# Patient Record
Sex: FEMALE | Race: WHITE | URBAN - METROPOLITAN AREA
[De-identification: names, ages, dates, MRNs, and addresses within clinical notes are randomized per-mention and may not be internally consistent; named-entity substitution may affect disease eponyms.]

---

## 2017-03-03 RX ORDER — ESZOPICLONE 1 MG/1
TABLET, FILM COATED ORAL
Qty: 30 TABLET | Refills: 2 | Status: SHIPPED | OUTPATIENT
Start: 2017-03-03 | End: 2017-05-16 | Stop reason: ALTCHOICE

## 2017-03-06 ENCOUNTER — OFFICE VISIT (OUTPATIENT)
Dept: INTERNAL MEDICINE CLINIC | Age: 47
End: 2017-03-06

## 2017-03-06 VITALS
DIASTOLIC BLOOD PRESSURE: 70 MMHG | SYSTOLIC BLOOD PRESSURE: 104 MMHG | WEIGHT: 153 LBS | OXYGEN SATURATION: 97 % | TEMPERATURE: 98 F | HEART RATE: 73 BPM | BODY MASS INDEX: 25.46 KG/M2

## 2017-03-06 DIAGNOSIS — J40 BRONCHITIS: ICD-10-CM

## 2017-03-06 DIAGNOSIS — J02.9 PHARYNGITIS, UNSPECIFIED ETIOLOGY: Primary | ICD-10-CM

## 2017-03-06 LAB — S PYO AG THROAT QL: NORMAL

## 2017-03-06 PROCEDURE — 99213 OFFICE O/P EST LOW 20 MIN: CPT | Performed by: NURSE PRACTITIONER

## 2017-03-06 PROCEDURE — 87880 STREP A ASSAY W/OPTIC: CPT | Performed by: NURSE PRACTITIONER

## 2017-03-06 RX ORDER — BENZONATATE 100 MG/1
200 CAPSULE ORAL 3 TIMES DAILY PRN
Qty: 30 CAPSULE | Refills: 0 | Status: SHIPPED | OUTPATIENT
Start: 2017-03-06 | End: 2017-03-13

## 2017-03-06 RX ORDER — BUDESONIDE AND FORMOTEROL FUMARATE DIHYDRATE 160; 4.5 UG/1; UG/1
2 AEROSOL RESPIRATORY (INHALATION) 2 TIMES DAILY
Qty: 1 INHALER | Refills: 0 | COMMUNITY
Start: 2017-03-06 | End: 2017-05-16 | Stop reason: ALTCHOICE

## 2017-03-07 ENCOUNTER — TELEPHONE (OUTPATIENT)
Dept: INTERNAL MEDICINE CLINIC | Age: 47
End: 2017-03-07

## 2017-03-23 ENCOUNTER — TELEPHONE (OUTPATIENT)
Dept: INTERNAL MEDICINE CLINIC | Age: 47
End: 2017-03-23

## 2017-03-23 RX ORDER — AMOXICILLIN AND CLAVULANATE POTASSIUM 875; 125 MG/1; MG/1
1 TABLET, FILM COATED ORAL 2 TIMES DAILY WITH MEALS
Qty: 20 TABLET | Refills: 0 | Status: SHIPPED | OUTPATIENT
Start: 2017-03-23 | End: 2017-03-23 | Stop reason: SDUPTHER

## 2017-03-23 RX ORDER — AMOXICILLIN AND CLAVULANATE POTASSIUM 875; 125 MG/1; MG/1
1 TABLET, FILM COATED ORAL 2 TIMES DAILY WITH MEALS
Qty: 20 TABLET | Refills: 0 | Status: SHIPPED | OUTPATIENT
Start: 2017-03-23 | End: 2017-04-02

## 2017-05-16 ENCOUNTER — TELEPHONE (OUTPATIENT)
Dept: INTERNAL MEDICINE CLINIC | Age: 47
End: 2017-05-16

## 2017-05-16 ENCOUNTER — OFFICE VISIT (OUTPATIENT)
Dept: INTERNAL MEDICINE CLINIC | Age: 47
End: 2017-05-16

## 2017-05-16 VITALS
OXYGEN SATURATION: 99 % | DIASTOLIC BLOOD PRESSURE: 78 MMHG | SYSTOLIC BLOOD PRESSURE: 107 MMHG | HEART RATE: 74 BPM | WEIGHT: 153 LBS | BODY MASS INDEX: 26.26 KG/M2

## 2017-05-16 DIAGNOSIS — Z01.818 PRE-OP EVALUATION: Primary | ICD-10-CM

## 2017-05-16 PROCEDURE — 93000 ELECTROCARDIOGRAM COMPLETE: CPT | Performed by: INTERNAL MEDICINE

## 2017-05-16 PROCEDURE — 99214 OFFICE O/P EST MOD 30 MIN: CPT | Performed by: INTERNAL MEDICINE

## 2017-05-18 PROBLEM — D27.0: Status: ACTIVE | Noted: 2017-05-18

## 2017-09-28 RX ORDER — ESZOPICLONE 1 MG/1
TABLET, FILM COATED ORAL
Qty: 30 TABLET | Refills: 0 | Status: SHIPPED | OUTPATIENT
Start: 2017-09-28 | End: 2017-11-22 | Stop reason: SDUPTHER

## 2017-09-29 ENCOUNTER — TELEPHONE (OUTPATIENT)
Dept: INTERNAL MEDICINE CLINIC | Age: 47
End: 2017-09-29

## 2017-10-04 ENCOUNTER — OFFICE VISIT (OUTPATIENT)
Dept: INTERNAL MEDICINE CLINIC | Age: 47
End: 2017-10-04

## 2017-10-04 VITALS
DIASTOLIC BLOOD PRESSURE: 77 MMHG | OXYGEN SATURATION: 97 % | BODY MASS INDEX: 26.8 KG/M2 | HEART RATE: 83 BPM | SYSTOLIC BLOOD PRESSURE: 109 MMHG | TEMPERATURE: 98.6 F | WEIGHT: 157 LBS

## 2017-10-04 DIAGNOSIS — M25.551 RIGHT HIP PAIN: Primary | ICD-10-CM

## 2017-10-04 DIAGNOSIS — R21 RASH: ICD-10-CM

## 2017-10-04 PROCEDURE — 99213 OFFICE O/P EST LOW 20 MIN: CPT | Performed by: INTERNAL MEDICINE

## 2017-10-04 NOTE — PROGRESS NOTES
[address] Internal Medicine  Patient Encounter   Bill Cazares MD    DOS: 10/4/2017    Shelby Kaufman  :1970       Assessment/Plan:    Right hip pain  Atypical ,mild. Not consistent with intra-articular issue but rather soft tissue. Reassured that she doesn't have blood clot. Will watch over next week and notify office if any worsening    Rash  Etiology unknown. Hearld patch ID? Early cellulitis? Early shingles- appearance not consistent but symptoms would be. To call if any blisters or progression of rash. Discussed medications with patient who voiced understanding of their use and indications. All questions answered. No Follow-up on file. Medical Assistant Triage:  Chief Complaint   Patient presents with    Hip Pain     right hip pain x 5 days. Slight pain, redness in area. HPI:   This is a 55 y.o. female. She is here today for right hip pain and rash. Worried about a blood clot. Noticed pain lateral right hip on Friday. Mild and not changed with movement. Has persisted and this morning noticed a rash on front of hip. Sensitive in the area of the rash. Feels like pins and needles at times. No itching. No injury or different exercise. Overall feels well except for allergies. No vaginal discharge. Review of Systems    As per HPI. Patient Active Problem List   Diagnosis    Insomnia    Right tubo-ovarian mass    IUD (intrauterine device) in place    Hydradenitis    Cystadenofibroma of right ovary     Prior to Visit Medications    Medication Sig Taking?  Authorizing Provider   eszopiclone (LUNESTA) 1 MG TABS TAKE 1 TABLET BY MOUTH NIGHTLY Yes Bill Cazares MD   Cetirizine HCl (ZYRTEC ALLERGY PO) Take by mouth daily Yes Historical Provider, MD   fluticasone (FLONASE) 50 MCG/ACT nasal spray 1 spray by Nasal route daily Yes Historical Provider, MD     Allergies   Allergen Reactions    No Known Allergies      BP Readings from Last 3 Encounters:   10/04/17 109/77   17 100/70   05/16/17 107/78      Wt Readings from Last 3 Encounters:   10/04/17 157 lb (71.2 kg)   06/06/17 154 lb (69.9 kg)   05/16/17 153 lb (69.4 kg)     OBJECTIVE:  Vitals:    10/04/17 1254   BP: 109/77   Pulse: 83   Temp: 98.6 °F (37 °C)   TempSrc: Oral   SpO2: 97%   Weight: 157 lb (71.2 kg)       Physical Exam   Cardiovascular: Normal rate and regular rhythm. Pulmonary/Chest: Effort normal and breath sounds normal.   Musculoskeletal:        Right hip: She exhibits tenderness. She exhibits normal range of motion, no swelling, no crepitus and no deformity. Decreased strength: slight tenderness over lateral aspect of right ilac crest.   Lymphadenopathy:        Right: No inguinal adenopathy present. Neurological: She has normal reflexes.

## 2017-10-04 NOTE — MR AVS SNAPSHOT
your BMI by decreasing your calorie intake and becoming more physically active. Learn more at: [email or web address]          Instructions    Gastroenterologist:  Thea Madison MD   [phone]              Medications and Orders      Your Current Medications Are              eszopiclone (LUNESTA) 1 MG TABS TAKE 1 TABLET BY MOUTH NIGHTLY    Cetirizine HCl (ZYRTEC ALLERGY PO) Take by mouth daily    fluticasone (FLONASE) 50 MCG/ACT nasal spray 1 spray by Nasal route daily      Allergies              No Known Allergies          Additional Information        Basic Information     Date Of Birth Sex Race Ethnicity Preferred Language    1970 Female White Non-/Non  English      Problem List as of 10/4/2017  Date Reviewed: 10/4/2017                Cystadenofibroma of right ovary    Right tubo-ovarian mass    IUD (intrauterine device) in place    Hydradenitis    Insomnia      Immunizations as of 10/4/2017     Name Date    Tdap (Boostrix, Adacel) 1/3/2012      Preventive Care        Date Due    Pap Smear 11/29/1991    Yearly Flu Vaccine (1) 9/1/2017    Cholesterol Screening 6/15/2021    Tetanus Combination Vaccine (2 - Td) 1/3/2022            [employer] Signup           Our records indicate that you have an active "[employer] account. You can view your After Visit Summary by going to [email or web address] and logging in with your "[employer] username and password. If you don't have a "[employer] username and password but a parent or guardian has access to your record, the parent or guardian should login with their own "[employer] username and password and access your record to view the After Visit Summary. Additional Information  If you have questions, please contact the physician practice where you receive care. Remember, "[employer] is NOT to be used for urgent needs. For medical emergencies, dial 911. For questions regarding your [employer] account call [phone]. If you have a clinical question, please call your doctor's office.

## 2017-11-08 ENCOUNTER — OFFICE VISIT (OUTPATIENT)
Dept: INTERNAL MEDICINE CLINIC | Age: 47
End: 2017-11-08

## 2017-11-08 VITALS
DIASTOLIC BLOOD PRESSURE: 71 MMHG | BODY MASS INDEX: 26.63 KG/M2 | HEART RATE: 64 BPM | OXYGEN SATURATION: 99 % | WEIGHT: 156 LBS | SYSTOLIC BLOOD PRESSURE: 99 MMHG

## 2017-11-08 DIAGNOSIS — G47.00 INSOMNIA, UNSPECIFIED TYPE: Primary | ICD-10-CM

## 2017-11-08 DIAGNOSIS — M25.551 RIGHT HIP PAIN: ICD-10-CM

## 2017-11-08 PROCEDURE — 99213 OFFICE O/P EST LOW 20 MIN: CPT | Performed by: INTERNAL MEDICINE

## 2017-11-08 PROCEDURE — 90471 IMMUNIZATION ADMIN: CPT | Performed by: INTERNAL MEDICINE

## 2017-11-08 PROCEDURE — 90688 IIV4 VACCINE SPLT 0.5 ML IM: CPT | Performed by: INTERNAL MEDICINE

## 2017-11-08 RX ORDER — TRAZODONE HYDROCHLORIDE 50 MG/1
50-100 TABLET ORAL NIGHTLY PRN
Qty: 60 TABLET | Refills: 2 | Status: SHIPPED | OUTPATIENT
Start: 2017-11-08 | End: 2018-06-04 | Stop reason: SDUPTHER

## 2017-11-08 ASSESSMENT — PATIENT HEALTH QUESTIONNAIRE - PHQ9
SUM OF ALL RESPONSES TO PHQ9 QUESTIONS 1 & 2: 0
1. LITTLE INTEREST OR PLEASURE IN DOING THINGS: 0
SUM OF ALL RESPONSES TO PHQ QUESTIONS 1-9: 0
2. FEELING DOWN, DEPRESSED OR HOPELESS: 0

## 2017-11-08 NOTE — PATIENT INSTRUCTIONS
breath. Do this over and over again, resisting the urge to  or be critical of yourself. Be content to start over as many times as you need to. Each time you catch the awareness drifting is an opportunity to strengthen the skill of mindful observation, a time to strengthen your mental discipline. It is not a bad thing, its just what the undisciplined mind does. Continue practicing in this manner until the end of the time you set aside for this period of mindfulness practice. Three Minute Breathing Space  From Ramiro Mendenhall and [employer] (2002). 1. Awareness  Bring yourself into the present moment by deliberately adopting an erect and dignified posture If possible, close your eyes. Then ask:  Shira Body is my experience right now in thoughts in feeling and in bodily sensations?   Acknowledge and register your experience, even if it is unwanted. 2. Gathering  Then, gently redirect full attention to breathing, to each in-breath and to each out-breath as they follow, one after the other. Your breath can function as an anchor to bring you into the present and help you tune into a state of awareness and stillness. 3. Expanding  Expand the field of your awareness around your breathing, so that it includes a sense of the body as a whole, your posture, and facial expression. Concentration Practice  (From Alisha and Alisha in: Mindfulness and Psychotherapy (2005))  1. Find a comfortable posture. Close your eyes. Allow your body to be held, supported by the chair. Notice directly the sensation of your body in contact with the chair. 2. Notice that your breath is already moving on its own. 3. Narrow your attention to the flow of the breath at the tip of your nose, as it contacts the nostrils. 4. Whenever your attention wanders, and you notice that it has wondered, return your attention to the flow of the breath at the tip of your nose.   5. Allow yourself a few more breaths before slowly opening your eyes.  Mindfulness Practice  (From Kimberley in: Mindfulness and Psychotherapy (2005))  1. Find a comfortable posture. Close your eyes. Allow your body to be held, supported by the chair. Notice directly the sensation of your body in contact with the chair. 2. Allow whatever arises in your field of experience - visual images, sounds, physical sensations, feelings, thought formations - to come and go, to move freely. 3. Next bring attention to whatever becomes predominant in the field of experience. Mentally notice and give a word label to the type of thoughts that may arise, such as analyzing, planning, remembering, hearing, and so on. 4. Take a few more breaths before slowly opening your eyes.

## 2017-11-08 NOTE — PROGRESS NOTES
[address] [facility] Primary Care  Office Visit   Luiza Etienne MD    2017    Eva Watters  :1970       Assessment/Plan:  Insomnia, unspecified type  Would like to get off lunesta. Trying to titrate completely    Right hip pain  Posterior iliac crest.  Refer ot PT    Discussed medications with patient who voiced understanding of their use and indications. All questions answered. No Follow-up on file. Chief Complaint   Patient presents with    Check-Up     patient wants to discuss changing sleep meds. HPI:   This 55 y.o. female here for follow-up of insomia and to discuss hip discomfort. Still with pain on lateral right hip. 3/10 now, had been worse. In bed and when stands. Not with walking. Lately knees achey as well. No warmth or swelling. Rash resolved on its own. Insomnia: Has stopped before surgery and then after couldn't sleep again. Now taking 1/2 tablet 2-3 x/night, but has gone 2 nights without sleeping at all. ROS  As per HPI. Patient Active Problem List   Diagnosis    Insomnia    Right tubo-ovarian mass    IUD (intrauterine device) in place    Hydradenitis    Cystadenofibroma of right ovary     Prior to Visit Medications    Medication Sig Taking?  Authorizing Provider   traZODone (DESYREL) 50 MG tablet Take 1-2 tablets by mouth nightly as needed for Sleep Yes Luiza Etienne MD   eszopiclone (LUNESTA) 1 MG TABS TAKE 1 TABLET BY MOUTH NIGHTLY Yes Luiza Etienne MD   Cetirizine HCl (ZYRTEC ALLERGY PO) Take by mouth daily Yes Historical Provider, MD   fluticasone (FLONASE) 50 MCG/ACT nasal spray 1 spray by Nasal route daily Yes Historical Provider, MD       OBJECTIVE:  BP Readings from Last 3 Encounters:   17 99/71   10/04/17 109/77   17 100/70      Wt Readings from Last 3 Encounters:   17 156 lb (70.8 kg)   10/04/17 157 lb (71.2 kg)   17 154 lb (69.9 kg)     Vitals:    17 1056   BP: 99/71   Pulse: 64   SpO2: 99%   Weight: 156 lb (70.8 kg)     Body mass index is 26.63 kg/m². Physical Exam   Musculoskeletal:        Right hip: Normal. She exhibits normal range of motion and no tenderness.    Posterior aspect of right iliac crest tender

## 2017-11-08 NOTE — PROGRESS NOTES
Vaccine Information Sheet, \"Influenza - Inactivated\"  given to Christin Green, or parent/legal guardian of  Christin Green and verbalized understanding. Patient responses:    Have you ever had a reaction to a flu vaccine? Yes and No  Are you able to eat eggs without adverse effects? Yes  Do you have any current illness? No  Have you ever had Guillian Gurley Syndrome? No    Flu vaccine given per order. Please see immunization tab.

## 2017-11-22 RX ORDER — ESZOPICLONE 1 MG/1
TABLET, FILM COATED ORAL
Qty: 30 TABLET | Refills: 0 | Status: SHIPPED | OUTPATIENT
Start: 2017-11-22 | End: 2018-01-11 | Stop reason: SDUPTHER

## 2018-01-12 RX ORDER — ESZOPICLONE 1 MG/1
1 TABLET, FILM COATED ORAL NIGHTLY
Qty: 30 TABLET | Refills: 0 | Status: SHIPPED | OUTPATIENT
Start: 2018-01-12 | End: 2018-03-12 | Stop reason: SDUPTHER

## 2018-03-12 RX ORDER — ESZOPICLONE 1 MG/1
TABLET, FILM COATED ORAL
Qty: 30 TABLET | Refills: 0 | Status: SHIPPED | OUTPATIENT
Start: 2018-03-12 | End: 2018-04-11

## 2018-06-04 ENCOUNTER — PATIENT MESSAGE (OUTPATIENT)
Dept: INTERNAL MEDICINE CLINIC | Age: 48
End: 2018-06-04

## 2018-06-04 RX ORDER — TRAZODONE HYDROCHLORIDE 50 MG/1
50-100 TABLET ORAL NIGHTLY PRN
Qty: 60 TABLET | Refills: 2 | Status: SHIPPED | OUTPATIENT
Start: 2018-06-04 | End: 2018-10-16 | Stop reason: SDUPTHER

## 2018-10-16 ENCOUNTER — OFFICE VISIT (OUTPATIENT)
Dept: INTERNAL MEDICINE CLINIC | Age: 48
End: 2018-10-16
Payer: COMMERCIAL

## 2018-10-16 VITALS
HEIGHT: 65 IN | SYSTOLIC BLOOD PRESSURE: 112 MMHG | OXYGEN SATURATION: 98 % | HEART RATE: 64 BPM | BODY MASS INDEX: 25.83 KG/M2 | WEIGHT: 155 LBS | DIASTOLIC BLOOD PRESSURE: 62 MMHG

## 2018-10-16 DIAGNOSIS — Z13.1 SCREENING FOR DIABETES MELLITUS: ICD-10-CM

## 2018-10-16 DIAGNOSIS — G47.00 INSOMNIA, UNSPECIFIED TYPE: Primary | ICD-10-CM

## 2018-10-16 LAB
ANION GAP SERPL CALCULATED.3IONS-SCNC: 12 MMOL/L (ref 3–16)
BUN BLDV-MCNC: 11 MG/DL (ref 7–20)
CALCIUM SERPL-MCNC: 9.1 MG/DL (ref 8.3–10.6)
CHLORIDE BLD-SCNC: 101 MMOL/L (ref 99–110)
CO2: 27 MMOL/L (ref 21–32)
CREAT SERPL-MCNC: 0.9 MG/DL (ref 0.6–1.1)
GFR AFRICAN AMERICAN: >60
GFR NON-AFRICAN AMERICAN: >60
GLUCOSE FASTING: 85 MG/DL (ref 70–99)
POTASSIUM SERPL-SCNC: 4.4 MMOL/L (ref 3.5–5.1)
SODIUM BLD-SCNC: 140 MMOL/L (ref 136–145)

## 2018-10-16 PROCEDURE — 99213 OFFICE O/P EST LOW 20 MIN: CPT | Performed by: INTERNAL MEDICINE

## 2018-10-16 RX ORDER — TRAZODONE HYDROCHLORIDE 50 MG/1
75 TABLET ORAL NIGHTLY PRN
Qty: 135 TABLET | Refills: 3 | Status: SHIPPED | OUTPATIENT
Start: 2018-10-16 | End: 2019-11-26 | Stop reason: SDUPTHER

## 2018-10-16 ASSESSMENT — PATIENT HEALTH QUESTIONNAIRE - PHQ9
SUM OF ALL RESPONSES TO PHQ QUESTIONS 1-9: 0
SUM OF ALL RESPONSES TO PHQ9 QUESTIONS 1 & 2: 0
SUM OF ALL RESPONSES TO PHQ QUESTIONS 1-9: 0
1. LITTLE INTEREST OR PLEASURE IN DOING THINGS: 0
2. FEELING DOWN, DEPRESSED OR HOPELESS: 0

## 2019-11-30 RX ORDER — TRAZODONE HYDROCHLORIDE 50 MG/1
TABLET ORAL
Qty: 135 TABLET | Refills: 0 | Status: SHIPPED | OUTPATIENT
Start: 2019-11-30 | End: 2020-04-02

## 2020-01-08 ENCOUNTER — OFFICE VISIT (OUTPATIENT)
Dept: INTERNAL MEDICINE CLINIC | Age: 50
End: 2020-01-08
Payer: COMMERCIAL

## 2020-01-08 VITALS
OXYGEN SATURATION: 98 % | SYSTOLIC BLOOD PRESSURE: 96 MMHG | BODY MASS INDEX: 24.49 KG/M2 | HEIGHT: 65 IN | WEIGHT: 147 LBS | HEART RATE: 72 BPM | DIASTOLIC BLOOD PRESSURE: 60 MMHG

## 2020-01-08 PROCEDURE — 99396 PREV VISIT EST AGE 40-64: CPT | Performed by: INTERNAL MEDICINE

## 2020-01-08 SDOH — HEALTH STABILITY: MENTAL HEALTH: HOW MANY STANDARD DRINKS CONTAINING ALCOHOL DO YOU HAVE ON A TYPICAL DAY?: 1 OR 2

## 2020-01-08 SDOH — HEALTH STABILITY: MENTAL HEALTH: HOW OFTEN DO YOU HAVE A DRINK CONTAINING ALCOHOL?: 2-3 TIMES A WEEK

## 2020-01-08 ASSESSMENT — PATIENT HEALTH QUESTIONNAIRE - PHQ9
1. LITTLE INTEREST OR PLEASURE IN DOING THINGS: 0
SUM OF ALL RESPONSES TO PHQ QUESTIONS 1-9: 0
SUM OF ALL RESPONSES TO PHQ9 QUESTIONS 1 & 2: 0
2. FEELING DOWN, DEPRESSED OR HOPELESS: 0
SUM OF ALL RESPONSES TO PHQ QUESTIONS 1-9: 0

## 2020-01-08 NOTE — PROGRESS NOTES
appearance. She is well-developed. HENT:      Head: Normocephalic and atraumatic. Right Ear: Tympanic membrane, ear canal and external ear normal.      Left Ear: Tympanic membrane, ear canal and external ear normal.      Nose: Nose normal.      Mouth/Throat:      Mouth: Mucous membranes are moist.      Pharynx: Oropharynx is clear. Eyes:      Conjunctiva/sclera: Conjunctivae normal.      Pupils: Pupils are equal, round, and reactive to light. Neck:      Musculoskeletal: Normal range of motion and neck supple. Thyroid: No thyromegaly. Vascular: No carotid bruit. Cardiovascular:      Rate and Rhythm: Normal rate and regular rhythm. Pulses: Normal pulses. Heart sounds: Normal heart sounds. No murmur. Comments: Prominent vein posterior right calf  Pulmonary:      Effort: Pulmonary effort is normal.      Breath sounds: Normal breath sounds. Abdominal:      General: Bowel sounds are normal.      Palpations: Abdomen is soft. There is no mass. Tenderness: There is no tenderness. Genitourinary:     Exam position: Supine. Musculoskeletal:      Right lower leg: No edema. Left lower leg: No edema. Lymphadenopathy:      Cervical: No cervical adenopathy. Skin:     General: Skin is warm and dry. Coloration: Skin is not pale. Findings: No rash. Comments: No suspicious skin lesions   Neurological:      General: No focal deficit present. Mental Status: She is alert. Psychiatric:         Mood and Affect: Mood normal.       Glucose:  Glucose (mg/dL)   Date Value   05/15/2017 83     Lipids  Lab Results   Component Value Date    CHOL 160 06/15/2016    TRIG 125 06/15/2016    HDL 52 06/15/2016    LDLCALC 83 06/15/2016       ASSESSMENT/PLAN:  Annual PE/Wellness exam  Discussed age appropriate preventive care including healthy diet, daily exercise, immunizations and age & gender guided screening tests.   Screening labs  Patient chriss schedule with

## 2020-01-10 ENCOUNTER — PATIENT MESSAGE (OUTPATIENT)
Dept: INTERNAL MEDICINE CLINIC | Age: 50
End: 2020-01-10

## 2020-04-02 RX ORDER — TRAZODONE HYDROCHLORIDE 50 MG/1
TABLET ORAL
Qty: 135 TABLET | Refills: 0 | Status: SHIPPED | OUTPATIENT
Start: 2020-04-02 | End: 2020-06-29

## 2020-06-29 RX ORDER — TRAZODONE HYDROCHLORIDE 50 MG/1
TABLET ORAL
Qty: 135 TABLET | Refills: 0 | Status: SHIPPED | OUTPATIENT
Start: 2020-06-29 | End: 2020-08-19

## 2020-08-19 RX ORDER — TRAZODONE HYDROCHLORIDE 50 MG/1
TABLET ORAL
Qty: 135 TABLET | Refills: 0 | Status: SHIPPED | OUTPATIENT
Start: 2020-08-19 | End: 2020-11-16 | Stop reason: SDUPTHER

## 2020-08-19 NOTE — TELEPHONE ENCOUNTER
Last appointment: 1/8/2020  Next appointment: Visit date not found  Last refill: 6/29/20      Disposition states to return around 1/8/2021.

## 2020-11-16 ENCOUNTER — TELEPHONE (OUTPATIENT)
Dept: INTERNAL MEDICINE CLINIC | Age: 50
End: 2020-11-16

## 2020-11-16 RX ORDER — TRAZODONE HYDROCHLORIDE 50 MG/1
TABLET ORAL
Qty: 135 TABLET | Refills: 0 | Status: SHIPPED | OUTPATIENT
Start: 2020-11-16 | End: 2021-02-16 | Stop reason: SDUPTHER

## 2020-11-16 NOTE — TELEPHONE ENCOUNTER
Patient is requesting a re-fill on Trazadone be sent to her pharmacy. Please call patient with any questions/concerns. Thanks.

## 2020-11-16 NOTE — TELEPHONE ENCOUNTER
Last appointment: 1/8/2020  Next appointment: Visit date not found  Last refill: 08/19/2020 # 135 with no refills

## 2020-11-20 ENCOUNTER — TELEPHONE (OUTPATIENT)
Dept: INTERNAL MEDICINE CLINIC | Age: 50
End: 2020-11-20

## 2020-11-20 NOTE — TELEPHONE ENCOUNTER
Spoke with patient, son was exposed last Friday to a + Covid, he started having sx Saturday was tested yesterday for COVID and was +. Patient started with nasal congestion/head cold Wednesday. She would like to be tested today. She is taking daughter to be tested [facility] and would like an order sent there for her self.  Please advise  Thank you

## 2020-11-21 LAB — SARS-COV-2: DETECTED

## 2020-11-24 ENCOUNTER — VIRTUAL VISIT (OUTPATIENT)
Dept: INTERNAL MEDICINE CLINIC | Age: 50
End: 2020-11-24
Payer: COMMERCIAL

## 2020-11-24 PROCEDURE — 99213 OFFICE O/P EST LOW 20 MIN: CPT | Performed by: INTERNAL MEDICINE

## 2020-11-24 NOTE — PROGRESS NOTES
2020    TELEHEALTH EVALUATION -- Audio/Visual (During [id number] public health emergency)    HPI:  Jossie Munoz (:  1970) has requested an audio/video evaluation for the following concern(s):   Follow-up (COVID f/u)     Covid test done last Friday after she found out that son had tested positive following exposure to a hockey game. Son just had mild cold symptoms patient states she has the same  Feels just like cold and also headache that started 2020. Extra tired and achy. No chest tightness, shortness of breath or coughing. She denies nausea vomiting diarrhea, loss of taste or smell. Review of Systems    Prior to Visit Medications    Medication Sig Taking? Authorizing Provider   traZODone (DESYREL) 50 MG tablet TAKE 1 AND 1/2 TABLETS BY MOUTH EVERY NIGHT AS NEEDED FOR SLEEP Yes Daniella Nicholson MD   Cetirizine HCl (ZYRTEC ALLERGY PO) Take by mouth daily Yes Historical Provider, MD       Social History     Tobacco Use    Smoking status: Never Smoker    Smokeless tobacco: Never Used   Substance Use Topics    Alcohol use: Yes     Alcohol/week: 0.0 standard drinks     Frequency: 2-3 times a week     Drinks per session: 1 or 2     Comment: 2 beer/wine a week    Drug use: No            BP Readings from Last 3 Encounters:   20 96/60   10/16/18 112/62   17 99/71     Wt Readings from Last 3 Encounters:   20 147 lb (66.7 kg)   10/16/18 155 lb (70.3 kg)   17 156 lb (70.8 kg)       PHYSICAL EXAMINATION:  Patient-Reported Vitals 2020   Patient-Reported Weight 145 lb   Patient-Reported Pulse 59   Patient-Reported Temperature 97.2      Physical Exam  Constitutional:       General: She is not in acute distress. Appearance: Normal appearance. She is not ill-appearing. Pulmonary:      Effort: Pulmonary effort is normal. No respiratory distress. Skin:     Coloration: Skin is not pale. Neurological:      General: No focal deficit present.       Mental Status: She is alert. Psychiatric:         Mood and Affect: Mood normal.         Due to this being a TeleHealth encounter, evaluation of the following organ systems is limited: Vitals/Constitutional/EENT/Resp/CV/GI//MS/Neuro/Skin/Heme-Lymph-Imm    ASSESSMENT/PLAN:  COVID-19 virus infection  Mild symptoms, with no risk factors for severe disease. Discussed self management and self monitoring. Patient has pulse ox  Reviewed guidelines for isolation, and advised patient that in the event that she is better, with no fever, her isolation will and November 30. Patient has been instructed to contact the office with worsening of symptoms    An  electronic signature was used to authenticate this note. --Celestino Jonas MD on 11/24/2020 at 12:28 PM    [id number]    Pursuant to the emergency declaration under the [facility] waiver authority and the [employer] Act, this Virtual  Visit was conducted, with patient's consent, to reduce the patient's risk of exposure to COVID-19 and provide continuity of care for an established patient. Services were provided through a video synchronous discussion virtually to substitute for in-person clinic visit.

## 2021-01-14 ENCOUNTER — E-VISIT (OUTPATIENT)
Dept: INTERNAL MEDICINE CLINIC | Age: 51
End: 2021-01-14
Payer: COMMERCIAL

## 2021-01-14 DIAGNOSIS — B37.31 VAGINAL CANDIDIASIS: Primary | ICD-10-CM

## 2021-01-14 PROCEDURE — 99421 OL DIG E/M SVC 5-10 MIN: CPT | Performed by: INTERNAL MEDICINE

## 2021-01-14 RX ORDER — FLUCONAZOLE 150 MG/1
150 TABLET ORAL ONCE
Qty: 1 TABLET | Refills: 0 | Status: SHIPPED | OUTPATIENT
Start: 2021-01-14 | End: 2021-01-14

## 2021-01-14 NOTE — PROGRESS NOTES
HPI: as per patient provided history  Exam: N/A (electronic visit)  ASSESSMENT/PLAN:  Diagnoses and all orders for this visit:    Vaginal candidiasis    Other orders  -     fluconazole (DIFLUCAN) 150 MG tablet; Take 1 tablet by mouth once for 1 dose        Patient instructed to call the office if worsens, or fails to improve as anticipated. 5-10 minutes were spent on the digital evaluation and management of this patient.

## 2021-02-16 RX ORDER — TRAZODONE HYDROCHLORIDE 50 MG/1
TABLET ORAL
Qty: 135 TABLET | Refills: 0 | Status: SHIPPED | OUTPATIENT
Start: 2021-02-16 | End: 2021-08-22

## 2021-02-16 NOTE — TELEPHONE ENCOUNTER
Received refill request from pharmacy for Trazodone.   LOV 1/8/2020  No upcoming appts  Last filled 11/16/2020  Medication pended

## 2021-02-23 ENCOUNTER — TELEPHONE (OUTPATIENT)
Dept: INTERNAL MEDICINE CLINIC | Age: 51
End: 2021-02-23

## 2021-02-23 NOTE — TELEPHONE ENCOUNTER
It could be due to the difference in med, especially if it correlates with starting the new medication.   Since she has the prescription, she could try 1 1/2-2 tabs  and see if works any better at a little higher dose

## 2021-02-23 NOTE — TELEPHONE ENCOUNTER
Patient recently got a RX for Trazadone but looks different and pharmacy told her different . Since she has been taking she is having trouble sleeping again. Could this be attributed to the new . Please call to discuss her concerns.

## 2021-08-22 RX ORDER — TRAZODONE HYDROCHLORIDE 50 MG/1
TABLET ORAL
Qty: 60 TABLET | Refills: 0 | Status: SHIPPED | OUTPATIENT
Start: 2021-08-22 | End: 2021-09-30

## 2021-08-22 NOTE — TELEPHONE ENCOUNTER
Refill has been approved. Not seen in office since 1/2020. Have have schedule physical (or just med of/u if she prefers).

## 2021-09-30 RX ORDER — TRAZODONE HYDROCHLORIDE 50 MG/1
TABLET ORAL
Qty: 60 TABLET | Refills: 0 | Status: SHIPPED | OUTPATIENT
Start: 2021-09-30 | End: 2021-11-10

## 2021-09-30 NOTE — TELEPHONE ENCOUNTER
Last appointment: 1/14/2021  Next appointment: Visit date not found  Last refill: 8/22/2021  Sent wufoo message to schedule due/overdue appointment.

## 2021-11-10 RX ORDER — TRAZODONE HYDROCHLORIDE 50 MG/1
TABLET ORAL
Qty: 60 TABLET | Refills: 0 | Status: SHIPPED | OUTPATIENT
Start: 2021-11-10 | End: 2021-12-18

## 2021-11-10 NOTE — TELEPHONE ENCOUNTER
Last appointment: 1/14/2021  Next appointment: Visit date not found  Last refill: 9/30/21  Sent [employer] message to schedule due/overdue appointment.

## 2021-11-12 ENCOUNTER — TELEPHONE (OUTPATIENT)
Dept: INTERNAL MEDICINE CLINIC | Age: 51
End: 2021-11-12

## 2021-11-12 DIAGNOSIS — Z13.1 SCREENING FOR DIABETES MELLITUS: Primary | ICD-10-CM

## 2021-11-12 DIAGNOSIS — Z13.220 LIPID SCREENING: ICD-10-CM

## 2021-11-12 DIAGNOSIS — Z11.59 ENCOUNTER FOR HEPATITIS C SCREENING TEST FOR LOW RISK PATIENT: ICD-10-CM

## 2021-11-12 NOTE — TELEPHONE ENCOUNTER
----- Message from Herve Pemberton sent at 11/12/2021  9:31 AM EST -----  Subject: Message to Provider    QUESTIONS  Information for Provider? PT scheduled her physical and wanted to check to   see if she needed to do bloodwork prior to her appt or if that would be   discussed at the appt.   ---------------------------------------------------------------------------  --------------  CALL BACK INFO  What is the best way for the office to contact you? OK to leave message on   voicemail  Preferred Call Back Phone Number? [phone]  ---------------------------------------------------------------------------  --------------  SCRIPT ANSWERS  Relationship to Patient?  Self

## 2021-12-18 RX ORDER — TRAZODONE HYDROCHLORIDE 50 MG/1
TABLET ORAL
Qty: 60 TABLET | Refills: 0 | Status: SHIPPED | OUTPATIENT
Start: 2021-12-18 | End: 2021-12-21

## 2021-12-20 PROBLEM — D27.0: Status: RESOLVED | Noted: 2017-05-18 | Resolved: 2021-12-20

## 2021-12-21 ENCOUNTER — OFFICE VISIT (OUTPATIENT)
Dept: INTERNAL MEDICINE CLINIC | Age: 51
End: 2021-12-21
Payer: COMMERCIAL

## 2021-12-21 VITALS
RESPIRATION RATE: 14 BRPM | BODY MASS INDEX: 25.19 KG/M2 | OXYGEN SATURATION: 98 % | HEART RATE: 64 BPM | DIASTOLIC BLOOD PRESSURE: 80 MMHG | SYSTOLIC BLOOD PRESSURE: 112 MMHG | HEIGHT: 65 IN | WEIGHT: 151.2 LBS

## 2021-12-21 DIAGNOSIS — G47.00 INSOMNIA, UNSPECIFIED TYPE: ICD-10-CM

## 2021-12-21 DIAGNOSIS — Z86.39 H/O VITAMIN D DEFICIENCY: ICD-10-CM

## 2021-12-21 DIAGNOSIS — Z13.220 LIPID SCREENING: ICD-10-CM

## 2021-12-21 DIAGNOSIS — Z00.00 ANNUAL PHYSICAL EXAM: Primary | ICD-10-CM

## 2021-12-21 DIAGNOSIS — Z13.1 SCREENING FOR DIABETES MELLITUS: ICD-10-CM

## 2021-12-21 PROCEDURE — 90471 IMMUNIZATION ADMIN: CPT | Performed by: INTERNAL MEDICINE

## 2021-12-21 PROCEDURE — 99396 PREV VISIT EST AGE 40-64: CPT | Performed by: INTERNAL MEDICINE

## 2021-12-21 PROCEDURE — 90674 CCIIV4 VAC NO PRSV 0.5 ML IM: CPT | Performed by: INTERNAL MEDICINE

## 2021-12-21 RX ORDER — M-VIT,TX,IRON,MINS/CALC/FOLIC 27MG-0.4MG
1 TABLET ORAL DAILY
COMMUNITY
End: 2022-08-23

## 2021-12-21 RX ORDER — TRAZODONE HYDROCHLORIDE 50 MG/1
75 TABLET ORAL NIGHTLY
Qty: 135 TABLET | Refills: 3 | Status: SHIPPED | OUTPATIENT
Start: 2021-12-21

## 2021-12-21 SDOH — ECONOMIC STABILITY: FOOD INSECURITY: WITHIN THE PAST 12 MONTHS, THE FOOD YOU BOUGHT JUST DIDN'T LAST AND YOU DIDN'T HAVE MONEY TO GET MORE.: NEVER TRUE

## 2021-12-21 SDOH — ECONOMIC STABILITY: FOOD INSECURITY: WITHIN THE PAST 12 MONTHS, YOU WORRIED THAT YOUR FOOD WOULD RUN OUT BEFORE YOU GOT MONEY TO BUY MORE.: NEVER TRUE

## 2021-12-21 ASSESSMENT — PATIENT HEALTH QUESTIONNAIRE - PHQ9
2. FEELING DOWN, DEPRESSED OR HOPELESS: 0
SUM OF ALL RESPONSES TO PHQ9 QUESTIONS 1 & 2: 0
SUM OF ALL RESPONSES TO PHQ QUESTIONS 1-9: 0
1. LITTLE INTEREST OR PLEASURE IN DOING THINGS: 0

## 2021-12-21 ASSESSMENT — SOCIAL DETERMINANTS OF HEALTH (SDOH): HOW HARD IS IT FOR YOU TO PAY FOR THE VERY BASICS LIKE FOOD, HOUSING, MEDICAL CARE, AND HEATING?: NOT HARD AT ALL

## 2021-12-21 NOTE — PROGRESS NOTES
ASSESSMENT/PLAN:   Annual physical/preventive evaluation  Discussed age appropriate preventive care including healthy diet, daily exercise, immunizations and age & gender guided screening tests. Flu shot today. Patient will get covid booster today as well through [facility]  Patient aware she is due for Shingrix vaccine, to get in the new year. Pap smear requested from Dr. Mervat Charles. Mammogram requested from [facility] Screening for diabetes mellitus  -     Basic Metabolic Panel; Future  -     Hemoglobin A1C; Future  Lipid screening  -     Lipid Panel; Future  H/O vitamin D deficiency  -     Vitamin D 25 Hydroxy; Future  Insomnia, unspecified type  Assessment & Plan:  Well managed with trazodone 75 mg nightly. Continue same. Return in about 1 year (around 2022) for CPE. Meaghan Enrique (:  1970) is a 46 y.o. female, here for annual preventive visit. Exercise: 3-4x/week Pilates, body pump and spin  Diet: Regular    Taking multivitamin. Says d was low in past. Dr. Garcia Dries? Check mole on neck. Achier in general but thinks just age. Last period was in the summer, really heavy. Not having hot flashes. IUD removed earlier this  Year. No LMP recorded (lmp unknown). (Menstrual status: Irregular periods).      Patient Care Team:  Sinan Gtz MD as PCP - General (Internal Medicine)  Sinan Gtz MD as PCP - [address] [place] [address] [facility]  Abdirahman Sneed MD (Obstetrics & Gynecology)    Health Maintenance   Topic Date Due    Hepatitis C screen  Never done    Cervical cancer screen  Never done    Breast cancer screen  Never done    Shingles Vaccine (1 of 2) Never done    COVID-19 Vaccine (3 - Booster for [employer] series) 10/09/2021    DTaP/Tdap/Td vaccine (2 - Td or Tdap) 2022    Lipid screen  01/15/2025    Colon cancer screen colonoscopy  10/02/2028    Flu vaccine  Completed    HIV screen  Completed    Hepatitis A vaccine  Aged Out    Hepatitis B vaccine  Aged Out  Hib vaccine  Aged Out    Meningococcal (ACWY) vaccine  Aged Out    Pneumococcal 0-64 years Vaccine  Aged [employer] History   Administered Date(s) Administered    COVID-19, Pfizer, PF, 30mcg/0.3mL 03/20/2021, 04/09/2021    Influenza Vaccine, unspecified formulation 09/10/2018    Influenza Virus Vaccine 09/21/2020    Influenza, MDCK Quadv, IM, PF (Flucelvax 2 yrs and older) 12/21/2021    Influenza, Alexx Sinhala, 6 mo and older, IM (Fluzone, Flulaval) 11/08/2017    Influenza, Triv, 3 Years and older, IM (Afluria (5 yrs and older) 10/01/2019    Tdap (Boostrix, Adacel) 01/03/2012       Past Medical History:   Diagnosis Date    Anxiety 01/29/2015    situational; Didn't tolerate lexapro or effexor     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 Gerard Orta, every 5 years due to family history    DILATION AND CURETTAGE OF UTERUS  05/2006    DILATION AND CURETTAGE OF UTERUS  08/2008    DILATION AND CURETTAGE OF UTERUS  02/2009    SALPINGECTOMY Left 05/2017    Dr. Kiara Freire SALPINGO-OOPHORECTOMY Right 05/2017    Cystadenofibroma    UMBILICAL HERNIA REPAIR  2010    with mesh       Social History     Tobacco Use    Smoking status: Never Smoker    Smokeless tobacco: Never Used   Vaping Use    Vaping Use: Never used   Substance Use Topics    Alcohol use:  Yes     Alcohol/week: 0.0 standard drinks     Comment: 2 beer/wine a week    Drug use: No        Family History   Problem Relation Age of Onset    Colon Cancer Paternal Grandfather     Prostate Cancer Paternal Grandfather     Diabetes Paternal Grandfather     Cancer Paternal Grandfather     Colon Cancer Paternal Uncle     Prostate Cancer Other     Cancer Other     Asthma Mother     Depression Mother     High Cholesterol Mother     Hypertension Father     High Cholesterol Father     Coronary Art Dis Maternal Aunt     Coronary Art Dis Maternal Uncle     Lung Cancer Maternal Grandmother         smoker    Cancer Maternal Grandfather     Cancer Paternal Grandmother         non-hodgkins lymphoma     Review of Systems    Wt Readings from Last 5 Encounters:   12/21/21 151 lb 3.2 oz (68.6 kg)   01/08/20 147 lb (66.7 kg)   10/16/18 155 lb (70.3 kg)   11/08/17 156 lb (70.8 kg)   10/04/17 157 lb (71.2 kg)     Vitals:    12/21/21 1439   BP: 112/80   Pulse: 64   Resp: 14   SpO2: 98%   Weight: 151 lb 3.2 oz (68.6 kg)   Height: 5' 5\" (1.651 m)     Estimated body mass index is 25.16 kg/m² as calculated from the following:    Height as of this encounter: 5' 5\" (1.651 m). Weight as of this encounter: 151 lb 3.2 oz (68.6 kg). Physical Exam  Constitutional:       Appearance: Normal appearance. She is well-developed. HENT:      Right Ear: Tympanic membrane, ear canal and external ear normal.      Left Ear: Tympanic membrane, ear canal and external ear normal.      Nose: Nose normal.   Eyes:      Conjunctiva/sclera: Conjunctivae normal.      Pupils: Pupils are equal, round, and reactive to light. Neck:      Thyroid: No thyromegaly. Vascular: No carotid bruit. Cardiovascular:      Rate and Rhythm: Normal rate and regular rhythm. Pulses: Normal pulses. Heart sounds: Normal heart sounds. No murmur heard. Comments: Prominent vein posterior right calf  Pulmonary:      Effort: Pulmonary effort is normal.      Breath sounds: Normal breath sounds.    Abdominal: General: Bowel sounds are normal.      Palpations: Abdomen is soft. There is no mass. Tenderness: There is no abdominal tenderness. Genitourinary:     Exam position: Supine. Musculoskeletal:      Right lower leg: No edema. Left lower leg: No edema. Lymphadenopathy:      Cervical: No cervical adenopathy. Skin:     General: Skin is warm and dry. Coloration: Skin is not pale. Findings: No rash. Comments: No suspicious skin lesions. Wide based skin tag back of neck toward right. Neurological:      General: No focal deficit present. Mental Status: She is alert. Psychiatric:         Mood and Affect: Mood normal.         No flowsheet data found. Lab Results   Component Value Date    CHOL 201 01/08/2020    CHOLFAST 163 01/15/2020    TRIG 80 01/08/2020    TRIGLYCFAST 63 01/15/2020    HDL 54 01/15/2020    LDLCALC 96 01/15/2020    GLUF 85 10/16/2018    GLUCOSE 81 01/08/2020    LABA1C 5.2 01/08/2020       The 10-year ASCVD risk score (Braydon Lee et al., 2013) is: 0.8%    Values used to calculate the score:      Age: 46 years      Sex: Female      Is Non- : No      Diabetic: No      Tobacco smoker: No      Systolic Blood Pressure: 327 mmHg      Is BP treated: No      HDL Cholesterol: 54 mg/dL      Total Cholesterol: 163 mg/dL      An electronic signature was used to authenticate this note.     --Faiza Guerrero MD

## 2022-08-10 ENCOUNTER — TELEPHONE (OUTPATIENT)
Dept: INTERNAL MEDICINE CLINIC | Age: 52
End: 2022-08-10

## 2022-08-10 DIAGNOSIS — N64.4 BREAST PAIN, RIGHT: Primary | ICD-10-CM

## 2022-08-10 NOTE — TELEPHONE ENCOUNTER
----- Message from Juan Kelly sent at 8/10/2022  2:09 PM EDT -----  Subject: Appointment Request    Reason for Call: Established Patient Appointment needed: Urgent (Patient   Request) No Script    QUESTIONS    Reason for appointment request? Available appointments did not meet   patient need     Additional Information for Provider? Pt need a new order for mammo gram   for right breasts that having some soreness for last 10 days.  Need a call   back for apt to get order for mammo gram.   ---------------------------------------------------------------------------  --------------  Johanny DALTON  [phone]; OK to leave message on voicemail  ---------------------------------------------------------------------------  --------------  SCRIPT ANSWERS  COVID Screen: Shawna Hamm

## 2022-08-11 ENCOUNTER — TELEPHONE (OUTPATIENT)
Dept: INTERNAL MEDICINE CLINIC | Age: 52
End: 2022-08-11

## 2022-08-11 NOTE — TELEPHONE ENCOUNTER
Patient states she \"doesn't go to a [facility] for her DX Mammogram.\"  She is requesting a New order please be generated so she can have this done [facility] on [address]

## 2022-08-23 ENCOUNTER — OFFICE VISIT (OUTPATIENT)
Dept: INTERNAL MEDICINE CLINIC | Age: 52
End: 2022-08-23
Payer: COMMERCIAL

## 2022-08-23 VITALS
BODY MASS INDEX: 25.96 KG/M2 | OXYGEN SATURATION: 98 % | HEIGHT: 65 IN | WEIGHT: 155.8 LBS | HEART RATE: 67 BPM | TEMPERATURE: 97.8 F | SYSTOLIC BLOOD PRESSURE: 110 MMHG | DIASTOLIC BLOOD PRESSURE: 66 MMHG

## 2022-08-23 DIAGNOSIS — G47.00 INSOMNIA, UNSPECIFIED TYPE: Primary | ICD-10-CM

## 2022-08-23 DIAGNOSIS — M79.605 LEFT LEG PAIN: ICD-10-CM

## 2022-08-23 DIAGNOSIS — M25.561 ARTHRALGIA OF BOTH KNEES: ICD-10-CM

## 2022-08-23 DIAGNOSIS — M94.0 ACUTE COSTOCHONDRITIS: ICD-10-CM

## 2022-08-23 DIAGNOSIS — M79.10 MYALGIA: ICD-10-CM

## 2022-08-23 DIAGNOSIS — M25.562 ARTHRALGIA OF BOTH KNEES: ICD-10-CM

## 2022-08-23 PROCEDURE — 99214 OFFICE O/P EST MOD 30 MIN: CPT | Performed by: INTERNAL MEDICINE

## 2022-08-23 RX ORDER — MELOXICAM 15 MG/1
15 TABLET ORAL DAILY PRN
Qty: 90 TABLET | OUTPATIENT
Start: 2022-08-23

## 2022-08-23 RX ORDER — IBUPROFEN 800 MG/1
400 TABLET ORAL EVERY 8 HOURS
COMMUNITY

## 2022-08-23 RX ORDER — MELOXICAM 15 MG/1
15 TABLET ORAL DAILY PRN
Qty: 30 TABLET | Refills: 0 | Status: SHIPPED | OUTPATIENT
Start: 2022-08-23 | End: 2022-09-19

## 2022-08-23 ASSESSMENT — PATIENT HEALTH QUESTIONNAIRE - PHQ9
SUM OF ALL RESPONSES TO PHQ QUESTIONS 1-9: 0
1. LITTLE INTEREST OR PLEASURE IN DOING THINGS: 0
SUM OF ALL RESPONSES TO PHQ QUESTIONS 1-9: 0
SUM OF ALL RESPONSES TO PHQ9 QUESTIONS 1 & 2: 0
2. FEELING DOWN, DEPRESSED OR HOPELESS: 0

## 2022-08-23 ASSESSMENT — ENCOUNTER SYMPTOMS
COUGH: 0
SHORTNESS OF BREATH: 0
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
## 2022-08-23 NOTE — TELEPHONE ENCOUNTER
Last appointment: 8/23/2022  Next appointment: Visit date not found  Last refill: 90 day supply requested

## 2022-08-24 DIAGNOSIS — M25.562 ARTHRALGIA OF BOTH KNEES: ICD-10-CM

## 2022-08-24 DIAGNOSIS — M25.561 ARTHRALGIA OF BOTH KNEES: ICD-10-CM

## 2022-08-24 DIAGNOSIS — M79.605 LEFT LEG PAIN: ICD-10-CM

## 2022-08-24 LAB
A/G RATIO: 2.1 (ref 1.1–2.2)
ALBUMIN SERPL-MCNC: 4.5 G/DL (ref 3.4–5)
ALP BLD-CCNC: 51 U/L (ref 40–129)
ALT SERPL-CCNC: 14 U/L (ref 10–40)
ANION GAP SERPL CALCULATED.3IONS-SCNC: 9 MMOL/L (ref 3–16)
AST SERPL-CCNC: 23 U/L (ref 15–37)
BILIRUB SERPL-MCNC: 0.5 MG/DL (ref 0–1)
BUN BLDV-MCNC: 16 MG/DL (ref 7–20)
C-REACTIVE PROTEIN: <3 MG/L (ref 0–5.1)
CALCIUM SERPL-MCNC: 9.4 MG/DL (ref 8.3–10.6)
CHLORIDE BLD-SCNC: 101 MMOL/L (ref 99–110)
CO2: 29 MMOL/L (ref 21–32)
CREAT SERPL-MCNC: 1.1 MG/DL (ref 0.6–1.1)
GFR AFRICAN AMERICAN: >60
GFR NON-AFRICAN AMERICAN: 52
GLUCOSE BLD-MCNC: 87 MG/DL (ref 70–99)
HCT VFR BLD CALC: 38.1 % (ref 36–48)
HEMOGLOBIN: 12.9 G/DL (ref 12–16)
MCH RBC QN AUTO: 30.8 PG (ref 26–34)
MCHC RBC AUTO-ENTMCNC: 34 G/DL (ref 31–36)
MCV RBC AUTO: 90.6 FL (ref 80–100)
PDW BLD-RTO: 13.6 % (ref 12.4–15.4)
PLATELET # BLD: 175 K/UL (ref 135–450)
PMV BLD AUTO: 9 FL (ref 5–10.5)
POTASSIUM SERPL-SCNC: 4.5 MMOL/L (ref 3.5–5.1)
RBC # BLD: 4.2 M/UL (ref 4–5.2)
SEDIMENTATION RATE, ERYTHROCYTE: 6 MM/HR (ref 0–30)
SODIUM BLD-SCNC: 139 MMOL/L (ref 136–145)
TOTAL PROTEIN: 6.6 G/DL (ref 6.4–8.2)
TSH REFLEX: 1.73 UIU/ML (ref 0.27–4.2)
WBC # BLD: 3.7 K/UL (ref 4–11)

## 2022-08-24 NOTE — ASSESSMENT & PLAN NOTE
Recently not as well controlled with trazodone, due to anxiety about possible breast cancer with her chest/breast pain. Continue trazodone 75 mg. Continue further titration if persists.

## 2022-08-25 DIAGNOSIS — M79.10 MYALGIA: ICD-10-CM

## 2022-08-25 DIAGNOSIS — M79.10 MYALGIA: Primary | ICD-10-CM

## 2022-08-25 LAB
ANTI-NUCLEAR ANTIBODY (ANA): NEGATIVE
TOTAL CK: 85 U/L (ref 26–192)

## 2022-08-25 NOTE — RESULT ENCOUNTER NOTE
Your blood tests look good except it based on the kidney test (GFR) it looks like you need to drinking more water. Very reassuring that there is nothing worrisome going on. I'd like you to consider physical therapy for the hip and leg pain to address any mechanical issues that may be contributing.

## 2022-09-19 DIAGNOSIS — R94.4 DECREASED GFR: Primary | ICD-10-CM

## 2022-09-19 RX ORDER — MELOXICAM 15 MG/1
15 TABLET ORAL DAILY PRN
Qty: 90 TABLET | Refills: 0 | Status: SHIPPED | OUTPATIENT
Start: 2022-09-19

## 2022-09-20 NOTE — TELEPHONE ENCOUNTER
Let her know that I received the refill request for meloxicam. I've sent it in, but need to recheck her kidney function because it had dropped some on most recent blood tests, and this medication can negatively effect kidneys.

## 2022-10-28 ENCOUNTER — OFFICE VISIT (OUTPATIENT)
Dept: INTERNAL MEDICINE CLINIC | Age: 52
End: 2022-10-28
Payer: COMMERCIAL

## 2022-10-28 ENCOUNTER — HOSPITAL ENCOUNTER (OUTPATIENT)
Dept: GENERAL RADIOLOGY | Age: 52
Discharge: HOME | End: 2022-10-28
Payer: COMMERCIAL

## 2022-10-28 VITALS
OXYGEN SATURATION: 97 % | BODY MASS INDEX: 25.86 KG/M2 | WEIGHT: 155.2 LBS | HEART RATE: 85 BPM | HEIGHT: 65 IN | DIASTOLIC BLOOD PRESSURE: 62 MMHG | SYSTOLIC BLOOD PRESSURE: 98 MMHG

## 2022-10-28 DIAGNOSIS — R07.89 RIGHT-SIDED CHEST WALL PAIN: ICD-10-CM

## 2022-10-28 DIAGNOSIS — R94.4 DECREASED GFR: Primary | ICD-10-CM

## 2022-10-28 PROCEDURE — 99213 OFFICE O/P EST LOW 20 MIN: CPT | Performed by: INTERNAL MEDICINE

## 2022-10-28 PROCEDURE — 71101 X-RAY EXAM UNILAT RIBS/CHEST: CPT

## 2022-10-28 NOTE — PROGRESS NOTES
Alexander Julien   :  1970  10/28/2022    ASSESSMENT/PLAN:   1. Decreased GFR  Comments:  no known CKD. Repeat today. Orders:  -     Basic Metabolic Panel; Future  2. Right-sided chest wall pain  Assessment & Plan:  Slightly worse over the last 3 months. focal bone pain proximal right fourth or fifth rib, almost at the costochondral junction. No significant improvement with NSAIDs. Abnormal alk phos and recent labs. X-ray right ribs and chest.  Bone scan or CT if normal.  Orders:  -     XR RIBS RIGHT INCLUDE CHEST (MIN 3 VIEWS); Future    No follow-ups on file. SUBJECTIVE     46 y.o. female established patient here for:   Chief Complaint   Patient presents with    Follow-up     Breast/ chest  pain       Ongoing chest wall pain. Doesn't bother at rest, but nagging discomfort. Notices if burps. Not with swallowing. Treated with meloxicam for 2 weeks on a daily basis. Maybe got better but never went completely away. Doesn't notice with exercise. Mainly notices when working or riding in the car, thinks maybe when not distracted. GFR reduced on recent labs. Review of Systems    Outpatient Medications Marked as Taking for the 10/28/22 encounter (Office Visit) with Doyle Santamaria MD   Medication Sig Dispense Refill    ibuprofen (ADVIL;MOTRIN) 800 MG tablet Take 400 mg by mouth every 8 (eight) hours      traZODone (DESYREL) 50 MG tablet Take 1.5 tablets by mouth nightly 135 tablet 3    Cetirizine HCl (ZYRTEC ALLERGY PO) Take by mouth daily         OBJECTIVE:  Vitals:    10/28/22 1310   BP: 98/62   Site: Left Upper Arm   Position: Sitting   Cuff Size: Medium Adult   Pulse: 85   SpO2: 97%   Weight: 155 lb 3.2 oz (70.4 kg)   Height: 5' 5\" (1.651 m)     Physical Exam  Constitutional:       Appearance: Normal appearance. Cardiovascular:      Heart sounds: Normal heart sounds. No friction rub. Pulmonary:      Breath sounds: Normal breath sounds.    Chest:      Chest wall: Tenderness (Focal tenderness of right fourth or fifth rib almost a costochondral junction. No deformity) present. Lymphadenopathy:      Upper Body:      Right upper body: No supraclavicular adenopathy. Left upper body: No supraclavicular adenopathy. This note was generated completely or in part utilizing Dragon dictation speech recognition software. Occasionally, words are mistranscribed and despite editing, the text may contain inaccuracies due to incorrect word recognition.   If further clarification is needed please contact the office at [phone]  --Jose M Marino MD

## 2022-10-28 NOTE — ASSESSMENT & PLAN NOTE
Slightly worse over the last 3 months. focal bone pain proximal right fourth or fifth rib, almost at the costochondral junction. No significant improvement with NSAIDs. Abnormal alk phos and recent labs.   X-ray right ribs and chest.  Bone scan or CT if normal.

## 2022-10-30 ENCOUNTER — PATIENT MESSAGE (OUTPATIENT)
Dept: INTERNAL MEDICINE CLINIC | Age: 52
End: 2022-10-30

## 2022-11-03 ENCOUNTER — HOSPITAL ENCOUNTER (OUTPATIENT)
Dept: CT IMAGING | Age: 52
Discharge: HOME | End: 2022-11-03
Payer: COMMERCIAL

## 2022-11-03 DIAGNOSIS — R07.89 RIGHT-SIDED CHEST WALL PAIN: ICD-10-CM

## 2022-11-03 PROCEDURE — 71260 CT THORAX DX C+: CPT

## 2022-11-03 PROCEDURE — 6360000004 HC RX CONTRAST MEDICATION: Performed by: INTERNAL MEDICINE

## 2022-11-03 RX ADMIN — IOPAMIDOL 75 ML: 755 INJECTION, SOLUTION INTRAVENOUS at 15:26

## 2022-12-30 RX ORDER — TRAZODONE HYDROCHLORIDE 50 MG/1
TABLET ORAL
Qty: 135 TABLET | Refills: 1 | Status: SHIPPED | OUTPATIENT
Start: 2022-12-30

## 2023-03-06 RX ORDER — MELOXICAM 15 MG/1
15 TABLET ORAL DAILY PRN
Qty: 90 TABLET | Refills: 0 | Status: SHIPPED | OUTPATIENT
Start: 2023-03-06

## 2023-06-27 RX ORDER — TRAZODONE HYDROCHLORIDE 50 MG/1
TABLET ORAL
Qty: 135 TABLET | Refills: 0 | Status: SHIPPED | OUTPATIENT
Start: 2023-06-27

## 2023-09-20 ENCOUNTER — HOSPITAL ENCOUNTER (EMERGENCY)
Age: 53
Discharge: HOME | End: 2023-09-21
Attending: EMERGENCY MEDICINE
Payer: COMMERCIAL

## 2023-09-20 DIAGNOSIS — R10.9 RIGHT SIDED ABDOMINAL PAIN: Primary | ICD-10-CM

## 2023-09-20 PROCEDURE — 96374 THER/PROPH/DIAG INJ IV PUSH: CPT

## 2023-09-20 PROCEDURE — 99285 EMERGENCY DEPT VISIT HI MDM: CPT

## 2023-09-21 ENCOUNTER — APPOINTMENT (OUTPATIENT)
Dept: CT IMAGING | Age: 53
End: 2023-09-21
Payer: COMMERCIAL

## 2023-09-21 ENCOUNTER — TELEPHONE (OUTPATIENT)
Dept: INTERNAL MEDICINE CLINIC | Age: 53
End: 2023-09-21

## 2023-09-21 VITALS
OXYGEN SATURATION: 100 % | WEIGHT: 159.8 LBS | RESPIRATION RATE: 16 BRPM | BODY MASS INDEX: 26.62 KG/M2 | HEIGHT: 65 IN | TEMPERATURE: 98.1 F | HEART RATE: 60 BPM | SYSTOLIC BLOOD PRESSURE: 128 MMHG | DIASTOLIC BLOOD PRESSURE: 91 MMHG

## 2023-09-21 LAB
ALBUMIN SERPL-MCNC: 4.5 G/DL (ref 3.4–5)
ALP SERPL-CCNC: 52 U/L (ref 40–129)
ALT SERPL-CCNC: 17 U/L (ref 10–40)
ANION GAP SERPL CALCULATED.3IONS-SCNC: 10 MMOL/L (ref 3–16)
AST SERPL-CCNC: 23 U/L (ref 15–37)
BASOPHILS # BLD: 0 K/UL (ref 0–0.2)
BASOPHILS NFR BLD: 0.8 %
BILIRUB DIRECT SERPL-MCNC: <0.2 MG/DL (ref 0–0.3)
BILIRUB INDIRECT SERPL-MCNC: NORMAL MG/DL (ref 0–1)
BILIRUB SERPL-MCNC: 0.3 MG/DL (ref 0–1)
BILIRUB UR QL STRIP.AUTO: NEGATIVE
BUN SERPL-MCNC: 17 MG/DL (ref 7–20)
CALCIUM SERPL-MCNC: 9.7 MG/DL (ref 8.3–10.6)
CHLORIDE SERPL-SCNC: 98 MMOL/L (ref 99–110)
CLARITY UR: CLEAR
CO2 SERPL-SCNC: 26 MMOL/L (ref 21–32)
COLOR UR: YELLOW
CREAT SERPL-MCNC: 0.8 MG/DL (ref 0.6–1.1)
DEPRECATED RDW RBC AUTO: 13.6 % (ref 12.4–15.4)
EOSINOPHIL # BLD: 0.2 K/UL (ref 0–0.6)
EOSINOPHIL NFR BLD: 3.2 %
GFR SERPLBLD CREATININE-BSD FMLA CKD-EPI: >60 ML/MIN/{1.73_M2}
GLUCOSE SERPL-MCNC: 106 MG/DL (ref 70–99)
GLUCOSE UR STRIP.AUTO-MCNC: NEGATIVE MG/DL
HCG SERPL QL: NEGATIVE
HCT VFR BLD AUTO: 39.3 % (ref 36–48)
HGB BLD-MCNC: 13.7 G/DL (ref 12–16)
HGB UR QL STRIP.AUTO: NEGATIVE
KETONES UR STRIP.AUTO-MCNC: NEGATIVE MG/DL
LEUKOCYTE ESTERASE UR QL STRIP.AUTO: NEGATIVE
LIPASE SERPL-CCNC: 41 U/L (ref 13–60)
LYMPHOCYTES # BLD: 2.2 K/UL (ref 1–5.1)
LYMPHOCYTES NFR BLD: 38.2 %
MCH RBC QN AUTO: 30.8 PG (ref 26–34)
MCHC RBC AUTO-ENTMCNC: 35 G/DL (ref 31–36)
MCV RBC AUTO: 88.1 FL (ref 80–100)
MONOCYTES # BLD: 0.4 K/UL (ref 0–1.3)
MONOCYTES NFR BLD: 7.5 %
NEUTROPHILS # BLD: 2.8 K/UL (ref 1.7–7.7)
NEUTROPHILS NFR BLD: 50.3 %
NITRITE UR QL STRIP.AUTO: NEGATIVE
PH UR STRIP.AUTO: 7 [PH] (ref 5–8)
PLATELET # BLD AUTO: 208 K/UL (ref 135–450)
PMV BLD AUTO: 8.1 FL (ref 5–10.5)
POTASSIUM SERPL-SCNC: 3.8 MMOL/L (ref 3.5–5.1)
PROT SERPL-MCNC: 7.2 G/DL (ref 6.4–8.2)
PROT UR STRIP.AUTO-MCNC: NEGATIVE MG/DL
RBC # BLD AUTO: 4.46 M/UL (ref 4–5.2)
RBC #/AREA URNS HPF: NORMAL /HPF (ref 0–4)
SODIUM SERPL-SCNC: 134 MMOL/L (ref 136–145)
SP GR UR STRIP.AUTO: <=1.005 (ref 1–1.03)
UA DIPSTICK W REFLEX MICRO PNL UR: NORMAL
URN SPEC COLLECT METH UR: NORMAL
UROBILINOGEN UR STRIP-ACNC: 0.2 E.U./DL
WBC # BLD AUTO: 5.6 K/UL (ref 4–11)
WBC #/AREA URNS HPF: NORMAL /HPF (ref 0–5)

## 2023-09-21 PROCEDURE — 80076 HEPATIC FUNCTION PANEL: CPT

## 2023-09-21 PROCEDURE — 6360000002 HC RX W HCPCS: Performed by: EMERGENCY MEDICINE

## 2023-09-21 PROCEDURE — 6360000004 HC RX CONTRAST MEDICATION: Performed by: EMERGENCY MEDICINE

## 2023-09-21 PROCEDURE — 74177 CT ABD & PELVIS W/CONTRAST: CPT

## 2023-09-21 PROCEDURE — 84703 CHORIONIC GONADOTROPIN ASSAY: CPT

## 2023-09-21 PROCEDURE — 81001 URINALYSIS AUTO W/SCOPE: CPT

## 2023-09-21 PROCEDURE — 83690 ASSAY OF LIPASE: CPT

## 2023-09-21 PROCEDURE — 80048 BASIC METABOLIC PNL TOTAL CA: CPT

## 2023-09-21 PROCEDURE — 85025 COMPLETE CBC W/AUTO DIFF WBC: CPT

## 2023-09-21 PROCEDURE — 2580000003 HC RX 258: Performed by: EMERGENCY MEDICINE

## 2023-09-21 RX ORDER — SODIUM CHLORIDE, SODIUM LACTATE, POTASSIUM CHLORIDE, AND CALCIUM CHLORIDE .6; .31; .03; .02 G/100ML; G/100ML; G/100ML; G/100ML
1000 INJECTION, SOLUTION INTRAVENOUS ONCE
Status: COMPLETED | OUTPATIENT
Start: 2023-09-21 | End: 2023-09-21

## 2023-09-21 RX ORDER — KETOROLAC TROMETHAMINE 30 MG/ML
15 INJECTION, SOLUTION INTRAMUSCULAR; INTRAVENOUS ONCE
Status: COMPLETED | OUTPATIENT
Start: 2023-09-21 | End: 2023-09-21

## 2023-09-21 RX ADMIN — IOPAMIDOL 75 ML: 755 INJECTION, SOLUTION INTRAVENOUS at 02:27

## 2023-09-21 RX ADMIN — SODIUM CHLORIDE, POTASSIUM CHLORIDE, SODIUM LACTATE AND CALCIUM CHLORIDE 1000 ML: 600; 310; 30; 20 INJECTION, SOLUTION INTRAVENOUS at 01:16

## 2023-09-21 RX ADMIN — KETOROLAC TROMETHAMINE 15 MG: 30 INJECTION, SOLUTION INTRAMUSCULAR; INTRAVENOUS at 01:14

## 2023-09-21 ASSESSMENT — ENCOUNTER SYMPTOMS
VOMITING: 0
NAUSEA: 0
EYES NEGATIVE: 1
ABDOMINAL PAIN: 1
RESPIRATORY NEGATIVE: 1
DIARRHEA: 0

## 2023-09-21 ASSESSMENT — PAIN DESCRIPTION - DESCRIPTORS: DESCRIPTORS: ACHING

## 2023-09-21 ASSESSMENT — PAIN SCALES - GENERAL
PAINLEVEL_OUTOF10: 8
PAINLEVEL_OUTOF10: 8

## 2023-09-21 ASSESSMENT — PAIN DESCRIPTION - LOCATION
LOCATION: ABDOMEN
LOCATION: ABDOMEN

## 2023-09-21 ASSESSMENT — PAIN DESCRIPTION - ORIENTATION
ORIENTATION: MID
ORIENTATION: RIGHT

## 2023-09-21 ASSESSMENT — PAIN - FUNCTIONAL ASSESSMENT
PAIN_FUNCTIONAL_ASSESSMENT: ACTIVITIES ARE NOT PREVENTED
PAIN_FUNCTIONAL_ASSESSMENT: 0-10

## 2023-09-21 NOTE — TELEPHONE ENCOUNTER
In ED last night for abdominal pain. Please call today and see if feeling better. Offer ED follow-up for tomorrow if still with symptoms.

## 2023-09-21 NOTE — DISCHARGE INSTRUCTIONS
Your testing in the emergency department did not determine a cause of your pain. Use Tylenol or ibuprofen as needed for pain. Follow-up with your primary care provider for repeat evaluation and further testing if your symptoms continue.

## 2023-09-22 ENCOUNTER — OFFICE VISIT (OUTPATIENT)
Dept: INTERNAL MEDICINE CLINIC | Age: 53
End: 2023-09-22
Payer: COMMERCIAL

## 2023-09-22 VITALS
SYSTOLIC BLOOD PRESSURE: 106 MMHG | HEART RATE: 72 BPM | DIASTOLIC BLOOD PRESSURE: 62 MMHG | OXYGEN SATURATION: 96 % | BODY MASS INDEX: 26.03 KG/M2 | WEIGHT: 156.4 LBS

## 2023-09-22 DIAGNOSIS — G47.00 INSOMNIA, UNSPECIFIED TYPE: ICD-10-CM

## 2023-09-22 DIAGNOSIS — Z12.11 COLON CANCER SCREENING: ICD-10-CM

## 2023-09-22 DIAGNOSIS — R10.31 RIGHT LOWER QUADRANT ABDOMINAL PAIN: Primary | ICD-10-CM

## 2023-09-22 DIAGNOSIS — Z23 NEED FOR INFLUENZA VACCINATION: ICD-10-CM

## 2023-09-22 PROCEDURE — 90674 CCIIV4 VAC NO PRSV 0.5 ML IM: CPT | Performed by: INTERNAL MEDICINE

## 2023-09-22 PROCEDURE — 99213 OFFICE O/P EST LOW 20 MIN: CPT | Performed by: INTERNAL MEDICINE

## 2023-09-22 PROCEDURE — 90471 IMMUNIZATION ADMIN: CPT | Performed by: INTERNAL MEDICINE

## 2023-09-22 RX ORDER — HYOSCYAMINE SULFATE 0.125 MG
0.12 TABLET ORAL EVERY 4 HOURS PRN
Qty: 10 TABLET | Refills: 0 | Status: SHIPPED | OUTPATIENT
Start: 2023-09-22 | End: 2023-09-24

## 2023-09-22 RX ORDER — TRAZODONE HYDROCHLORIDE 50 MG/1
TABLET ORAL
Qty: 135 TABLET | Refills: 1 | Status: SHIPPED | OUTPATIENT
Start: 2023-09-22

## 2023-09-22 SDOH — ECONOMIC STABILITY: FOOD INSECURITY: WITHIN THE PAST 12 MONTHS, THE FOOD YOU BOUGHT JUST DIDN'T LAST AND YOU DIDN'T HAVE MONEY TO GET MORE.: NEVER TRUE

## 2023-09-22 SDOH — ECONOMIC STABILITY: HOUSING INSECURITY
IN THE LAST 12 MONTHS, WAS THERE A TIME WHEN YOU DID NOT HAVE A STEADY PLACE TO SLEEP OR SLEPT IN A SHELTER (INCLUDING NOW)?: NO

## 2023-09-22 SDOH — ECONOMIC STABILITY: INCOME INSECURITY: HOW HARD IS IT FOR YOU TO PAY FOR THE VERY BASICS LIKE FOOD, HOUSING, MEDICAL CARE, AND HEATING?: NOT HARD AT ALL

## 2023-09-22 SDOH — ECONOMIC STABILITY: FOOD INSECURITY: WITHIN THE PAST 12 MONTHS, YOU WORRIED THAT YOUR FOOD WOULD RUN OUT BEFORE YOU GOT MONEY TO BUY MORE.: NEVER TRUE

## 2023-09-22 ASSESSMENT — PATIENT HEALTH QUESTIONNAIRE - PHQ9
SUM OF ALL RESPONSES TO PHQ QUESTIONS 1-9: 0
2. FEELING DOWN, DEPRESSED OR HOPELESS: 0
SUM OF ALL RESPONSES TO PHQ QUESTIONS 1-9: 0
SUM OF ALL RESPONSES TO PHQ QUESTIONS 1-9: 0
1. LITTLE INTEREST OR PLEASURE IN DOING THINGS: 0
SUM OF ALL RESPONSES TO PHQ QUESTIONS 1-9: 0
SUM OF ALL RESPONSES TO PHQ9 QUESTIONS 1 & 2: 0

## 2023-09-22 NOTE — PROGRESS NOTES
Lorena Lemos   :  1970    ASSESSMENT/PLAN:   Right lower quadrant abdominal pain  Comments:  Resolved, etiology undetermined. No evidence of renal stone. Due to spasmodic nature, provided Rx levsin 0.125 mg prn. Insomnia, unspecified type  Colon cancer screening  -     AFL - Beth Condon MD, Gastroenterology, [facility]  Need for influenza vaccination  -     Influenza, FLUCELVAX, (age 10 mo+), IM, Preservative Free, 0.5 mL      Return for CPE in next 4-6 months. .      SUBJECTIVE     46 y.o. female established patient here for:   Chief Complaint   Patient presents with    Follow-up     ED follow-up for abdominal pain. Wednesday night with acute focal severe abdominal pain. After persisted for approx 3-4 hours, went to the emergency room. Had normal UA, no blood CT scan and labs. Also received a dose of Toradol. By time she was sleeping still has some mild discomfort but had improved and was gone by yesterday, with no recurrence. Denies increase gassiness. No constipation. No blood in the urine. Incidentally, she is due for her colonoscopy next month. Needs refill on trazodone. Still uses 1-1/2 nightly, working well. Mentions she still has the right upper chest wall discomfort. It has not changed over time, mild. Had normal chest CT last year for same. Review of Systems    Outpatient Medications Marked as Taking for the 23 encounter (Office Visit) with Antoinette Krishnan MD   Medication Sig Dispense Refill    traZODone (DESYREL) 50 MG tablet TAKE 1 AND 1/2 TABLETS BY MOUTH EVERY NIGHT 135 tablet 1       OBJECTIVE:  Vitals:    23 1604   BP: 106/62   Pulse: 72   SpO2: 96%   Weight: 156 lb 6.4 oz (70.9 kg)  Comment: shoes off     Physical Exam  Constitutional:       Appearance: Normal appearance. Abdominal:      General: Abdomen is flat. Bowel sounds are normal.      Palpations: Abdomen is soft.       Tenderness: Tenderness: mild tenderness iwth deep

## 2023-09-22 NOTE — PROGRESS NOTES
Junior Garvin   :  1970    ASSESSMENT/PLAN:   Need for influenza vaccination  -     Influenza, FLUCELVAX, (age 10 mo+), IM, Preservative Free, 0.5 mL  Colon cancer screening  -     AFL - Jose D Weber MD, Gastroenterology, [facility]      No follow-ups on file. {Time Documentation Optional:[id number]}      SUBJECTIVE     46 y.o. female established patient here for:   Chief Complaint   Patient presents with    Follow-up       Review of Systems    Outpatient Medications Marked as Taking for the 23 encounter (Office Visit) with April Walker MD   Medication Sig Dispense Refill    traZODone (DESYREL) 50 MG tablet TAKE 1 AND 1/2 TABLETS BY MOUTH EVERY NIGHT 135 tablet 1       OBJECTIVE:  Vitals:    23 1604   BP: 106/62   Pulse: 72   SpO2: 96%   Weight: 156 lb 6.4 oz (70.9 kg)  Comment: shoes off     Physical Exam    This note was generated completely or in part utilizing Dragon dictation speech recognition software. Occasionally, words are mistranscribed and despite editing, the text may contain inaccuracies due to incorrect word recognition.   If further clarification is needed please contact the office at [phone]  --April Walker MD

## 2023-09-24 RX ORDER — HYOSCYAMINE SULFATE 0.125 MG
TABLET ORAL
Qty: 10 TABLET | Refills: 0 | Status: SHIPPED | OUTPATIENT
Start: 2023-09-24

## 2023-09-24 RX ORDER — HYOSCYAMINE SULFATE 0.125 MG
TABLET ORAL
Qty: 10 TABLET | Refills: 0 | OUTPATIENT
Start: 2023-09-24

## 2023-09-24 RX ORDER — TRAZODONE HYDROCHLORIDE 50 MG/1
TABLET ORAL
Qty: 135 TABLET | Refills: 1 | OUTPATIENT
Start: 2023-09-24

## 2023-09-25 RX ORDER — HYOSCYAMINE SULFATE 0.125 MG
TABLET ORAL
Qty: 10 TABLET | Refills: 0 | OUTPATIENT
Start: 2023-09-25

## 2024-01-12 ENCOUNTER — TELEPHONE (OUTPATIENT)
Dept: INTERNAL MEDICINE CLINIC | Age: 54
End: 2024-01-12

## 2024-01-12 NOTE — TELEPHONE ENCOUNTER
Patient ended up declining appointment due to needing to be out of town and deciding she will try urgent care due to leaving in a few hours.

## 2024-01-12 NOTE — TELEPHONE ENCOUNTER
Patient is calling in for  in regards to current sx she is experiencing. Per patient for the last 4 days she has had a RT side sore throat , head congestion that have lasted all day even with taking ibuprofen . Per patient she dosnt see any white spots in the back of her throat and ha not taken a covid test yet but will be doing so and calling back with results. Since patient is on day 4 of sx could I offer the opening at 3:30? Or Np office ?    Please advise

## 2024-02-16 ENCOUNTER — TELEMEDICINE (OUTPATIENT)
Dept: INTERNAL MEDICINE CLINIC | Age: 54
End: 2024-02-16
Payer: COMMERCIAL

## 2024-02-16 DIAGNOSIS — B34.9 VIRAL SYNDROME: Primary | ICD-10-CM

## 2024-02-16 PROCEDURE — 99213 OFFICE O/P EST LOW 20 MIN: CPT | Performed by: INTERNAL MEDICINE

## 2024-02-16 RX ORDER — OSELTAMIVIR PHOSPHATE 75 MG/1
75 CAPSULE ORAL 2 TIMES DAILY
Qty: 10 CAPSULE | Refills: 0 | Status: SHIPPED | OUTPATIENT
Start: 2024-02-16 | End: 2024-02-21

## 2024-02-16 RX ORDER — ONDANSETRON 4 MG/1
4 TABLET, FILM COATED ORAL 3 TIMES DAILY PRN
Qty: 15 TABLET | Refills: 0 | Status: SHIPPED | OUTPATIENT
Start: 2024-02-16

## 2024-02-16 RX ORDER — CODEINE PHOSPHATE/GUAIFENESIN 10-100MG/5
5-10 LIQUID (ML) ORAL 3 TIMES DAILY PRN
Qty: 120 ML | Refills: 0 | Status: SHIPPED | OUTPATIENT
Start: 2024-02-16 | End: 2024-02-23

## 2024-02-16 NOTE — PROGRESS NOTES
TELEHEALTH EVALUATION -- Audio/Visual (During COVID-19 public health emergency)  2024  Eugenia DOMINGUEZ Duong (: 1970)     Eugenia DOMINGUEZ Duong, was evaluated through a synchronous (real-time) audio-video encounter. The patient (or guardian if applicable) is aware that this is a billable service, which includes applicable co-pays. This Virtual Visit was conducted with patient's (and/or legal guardian's) consent. Patient identification was verified, and a caregiver was present when appropriate.   The patient was located at Home: [address] [place] [id number]  Provider was located at Facility (Appt Dept): [address] [place] [address]         ASSESSMENT/PLAN:  Viral syndrome  Due to circumstances will treat presumptively for flu with tamiflu 75 mg bid. Discussed risks and benefits of the medication, including most common side effects.  Zofran 4 mg and Tyson Ac prn.  Repeat covid tomorrow, call if positive.   Advised on symptomatic treatment.  Advised to delay travel.   Needs N95 if tries to make trip.      -     guaiFENesin-codeine (TUSSI-ORGANIDIN NR) 100-10 MG/5ML syrup; Take 5-10 mLs by mouth 3 times daily as needed for Cough for up to 7 days. Max Daily Amount: 30 mLs, Disp-120 mL, R-0Normal    Call or return to clinic prn if these symptoms worsen or fail to improve as anticipated.      No follow-ups on file.         SUBJECTIVE/OBJECTIVE:  53 y.o. female here for evaluation of the following chief complaint(s):   Chief Complaint   Patient presents with    Cough    Fever     Sick x 24 hours, body aches, headaches, congestion, fever.   Urgent care last night, rapid flu neg, swab sent to lab.   Home covid test negative today.   Up to date covid and flu immunizations.   Advil 400 mg helps with symptoms.  Supposed to be chaperoning daughters trip to [place], leaving tomorrow.                   Review of Systems        2024     2:46 PM   Patient-Reported Vitals   Patient-Reported Weight 155lbs

## 2024-03-15 ENCOUNTER — OFFICE VISIT (OUTPATIENT)
Dept: FAMILY MEDICINE CLINIC | Age: 54
End: 2024-03-15
Payer: COMMERCIAL

## 2024-03-15 ENCOUNTER — TELEPHONE (OUTPATIENT)
Dept: FAMILY MEDICINE CLINIC | Age: 54
End: 2024-03-15

## 2024-03-15 VITALS
SYSTOLIC BLOOD PRESSURE: 120 MMHG | BODY MASS INDEX: 26.13 KG/M2 | TEMPERATURE: 96.9 F | OXYGEN SATURATION: 98 % | HEART RATE: 72 BPM | DIASTOLIC BLOOD PRESSURE: 72 MMHG | WEIGHT: 157 LBS

## 2024-03-15 DIAGNOSIS — H69.93 ACUTE DYSFUNCTION OF BOTH EUSTACHIAN TUBES: Primary | ICD-10-CM

## 2024-03-15 PROCEDURE — 99213 OFFICE O/P EST LOW 20 MIN: CPT | Performed by: NURSE PRACTITIONER

## 2024-03-15 RX ORDER — AMOXICILLIN AND CLAVULANATE POTASSIUM 875; 125 MG/1; MG/1
1 TABLET, FILM COATED ORAL 2 TIMES DAILY
Qty: 14 TABLET | Refills: 0 | Status: SHIPPED | OUTPATIENT
Start: 2024-03-15 | End: 2024-03-22

## 2024-03-15 RX ORDER — PREDNISONE 20 MG/1
20 TABLET ORAL 2 TIMES DAILY
Qty: 10 TABLET | Refills: 0 | Status: SHIPPED | OUTPATIENT
Start: 2024-03-15 | End: 2024-03-20

## 2024-03-15 ASSESSMENT — ENCOUNTER SYMPTOMS
SINUS PRESSURE: 0
COUGH: 1
COLOR CHANGE: 0
EYE REDNESS: 0
BACK PAIN: 0
BLOOD IN STOOL: 0
CONSTIPATION: 0
RHINORRHEA: 0
VOMITING: 0
SHORTNESS OF BREATH: 0
WHEEZING: 0
NAUSEA: 0
SORE THROAT: 1
ABDOMINAL PAIN: 0
CHEST TIGHTNESS: 0
EYE ITCHING: 0
DIARRHEA: 0

## 2024-03-15 NOTE — PROGRESS NOTES
and dry.   Neurological:      Mental Status: She is alert.   Psychiatric:         Mood and Affect: Mood normal.         Behavior: Behavior normal.                 An electronic signature was used to authenticate this note.    --ZANDER Pemberton - CNP

## 2024-03-15 NOTE — ASSESSMENT & PLAN NOTE
Continue with symptomatic management. Recommend increased water intake as well as saline irrigation, mucolytics, and antihistamines as needed. Advised to call back if no improvement over the next 4-7 days.

## 2024-03-15 NOTE — TELEPHONE ENCOUNTER
Patient called back. See's the order for the Prednisone but no Antibiotics.     Would like a call back that this was completed.

## 2024-03-15 NOTE — TELEPHONE ENCOUNTER
Pt is asking for an antibiotic to be sent to the pharmacy because she said that was discussed at the visit earlier today.

## 2024-03-20 RX ORDER — TRAZODONE HYDROCHLORIDE 50 MG/1
TABLET ORAL
Qty: 135 TABLET | Refills: 0 | Status: SHIPPED | OUTPATIENT
Start: 2024-03-20

## 2024-03-20 NOTE — TELEPHONE ENCOUNTER
Medication:   Requested Prescriptions     Pending Prescriptions Disp Refills    traZODone (DESYREL) 50 MG tablet [Pharmacy Med Name: TRAZODONE 50MG TABLETS] 135 tablet 1     Sig: TAKE 1 AND 1/2 TABLETS BY MOUTH EVERY NIGHT     Last Filled:  9/22/23    Last appt: 2/16/2024   Next appt: Visit date not found    Last OARRS:       9/28/2017    11:04 PM   RX Monitoring   Attestation The Prescription Monitoring Report for this patient was reviewed today.   Periodic Controlled Substance Monitoring No signs of potential drug abuse or diversion identified.

## 2024-05-03 ENCOUNTER — TELEPHONE (OUTPATIENT)
Dept: INTERNAL MEDICINE CLINIC | Age: 54
End: 2024-05-03

## 2024-05-03 NOTE — TELEPHONE ENCOUNTER
I recommend she see her gynecologist who can do a breast exam and advise on next steps.  In the event that she needs any testing, they will be able to followed up.  I am leaving next week and would not be able to follow through on anything.

## 2024-05-03 NOTE — TELEPHONE ENCOUNTER
Patient is calling in for  in regards to her RT nipple being extra sensitive to the touch and hard. Per patient dosnt feel a lump just hard tissue and is concerned due to feeling discomfort during the day and when she rolls over in her sleep. Sx have been going on for a couple of weeks, per patient has not established with another PCP. Patient offered an appointmetn with NP and declined due to wanting to get 's recommendation on what she should do for her  sx.     Please advise.

## 2024-06-17 RX ORDER — TRAZODONE HYDROCHLORIDE 50 MG/1
TABLET ORAL
Qty: 135 TABLET | Refills: 0 | OUTPATIENT
Start: 2024-06-17

## 2024-06-17 NOTE — TELEPHONE ENCOUNTER
All refills must be submitted to patient pcp.   No longer under the care of this office or providers.